# Patient Record
Sex: MALE
[De-identification: names, ages, dates, MRNs, and addresses within clinical notes are randomized per-mention and may not be internally consistent; named-entity substitution may affect disease eponyms.]

---

## 2018-07-09 ENCOUNTER — HOSPITAL ENCOUNTER (OUTPATIENT)
Dept: HOSPITAL 88 - CT | Age: 63
End: 2018-07-09
Attending: FAMILY MEDICINE
Payer: MEDICARE

## 2018-07-09 DIAGNOSIS — F17.210: Primary | ICD-10-CM

## 2018-07-09 PROCEDURE — 71250 CT THORAX DX C-: CPT

## 2021-06-23 NOTE — DIAGNOSTIC IMAGING REPORT
PROCEDURE: CT CHEST WITHOUT CONTRAST

CT scan of the chest WITHOUT intravenous contrast, using low dose 

nodule protocol.

 

TECHNIQUE:

The chest was scanned utilizing a multidetector helical scanner from 

the apex to the level of the adrenal glands.  Coronal and sagittal 

multiplanar reformations were obtained.

 

DLP:  217.13 mGy-cm

 

COMPARISON: None.

 

INDICATIONS:   SMOKER

     

FINDINGS:

Lines/tubes:  None.

 

Lungs and Airways: Mild emphysematous changes. No nodules or masses. 

Left lower lobe calcified granuloma

 

Pleura: The pleural spaces are clear.

 

Heart and mediastinum:  The thyroid gland is normal. No significant 

mediastinal, hilar or axillary lymphadenopathy is seen. The heart and 

pericardium are within normal limits. Coronary artery, aortic valve and 

thoracic aortic calcification.

 

Soft tissues: Normal.

 

Abdomen: Limited views of the upper abdomen show no abnormality within 

the visualized liver, spleen, pancreas, or kidneys. The adrenal glands 

are normal.

 

Bones: Mild degenerative changes of the spine.

 

IMPRESSION: 

 

1. Mild emphysematous changes.

2. No nodules or masses.

 

 

 

Patrice Lindsay D.O.  

Dictated by:  Patrice Lindsay D.O. on 7/09/2018 at 9:09     

Electronically approved by:  Patrice Lindsay D.O. on 7/09/2018 at 9:09 High Dose Vitamin A Pregnancy And Lactation Text: High dose vitamin A therapy is contraindicated during pregnancy and breast feeding.